# Patient Record
Sex: FEMALE
[De-identification: names, ages, dates, MRNs, and addresses within clinical notes are randomized per-mention and may not be internally consistent; named-entity substitution may affect disease eponyms.]

---

## 2024-10-31 ENCOUNTER — DASHBOARD ENCOUNTERS (OUTPATIENT)
Age: 49
End: 2024-10-31

## 2024-11-01 ENCOUNTER — OFFICE VISIT (OUTPATIENT)
Dept: URBAN - METROPOLITAN AREA CLINIC 113 | Facility: CLINIC | Age: 49
End: 2024-11-01
Payer: COMMERCIAL

## 2024-11-01 ENCOUNTER — LAB OUTSIDE AN ENCOUNTER (OUTPATIENT)
Dept: URBAN - METROPOLITAN AREA CLINIC 113 | Facility: CLINIC | Age: 49
End: 2024-11-01

## 2024-11-01 VITALS
HEART RATE: 71 BPM | BODY MASS INDEX: 47.09 KG/M2 | HEIGHT: 66 IN | SYSTOLIC BLOOD PRESSURE: 152 MMHG | WEIGHT: 293 LBS | DIASTOLIC BLOOD PRESSURE: 100 MMHG | TEMPERATURE: 97.3 F

## 2024-11-01 DIAGNOSIS — Z12.11 COLON CANCER SCREENING: ICD-10-CM

## 2024-11-01 DIAGNOSIS — K92.1 HEMATOCHEZIA: ICD-10-CM

## 2024-11-01 PROBLEM — 305058001: Status: ACTIVE | Noted: 2024-11-01

## 2024-11-01 PROBLEM — 449341000124102: Status: ACTIVE | Noted: 2024-11-01

## 2024-11-01 PROCEDURE — 99213 OFFICE O/P EST LOW 20 MIN: CPT | Performed by: NURSE PRACTITIONER

## 2024-11-01 RX ORDER — ERGOCALCIFEROL 1.25 MG/1
1 CAPSULE CAPSULE ORAL
Status: ACTIVE | COMMUNITY

## 2024-11-01 NOTE — HPI-TODAY'S VISIT:
This is a 49-year-old female with a history of hyperlipidemia, severe obesity referred from Dr. Stovall for colon cancer screening andrectal bleeding.  She denies a family history of colon cancer.  She has not had a prior colonoscopy.  She denies a history of anemia.  She reports 2 prior occasions during which she had blood in her stool.  This occurred in January and in September.  She has otherwise been asymptomatic.  She reports dark red stool and blood on the tissue with an increase in stool frequency lasting 2 to 3 days with each episode.  During the first episode, she was evaluated in the emergency department at [facility] and,  2 or 3 days later, at [facility].  She reports a CT and labs were performed.  She was initially diagnosed with a possible anal fissure and then informed she may have experienced bleeding associated with diverticulosis.  She reports rectal exams were positive for blood.  She has not had recurrence since September.  She is having 2 regular bowel movements per day.  She denies any abdominal symptoms. She denies a history of hypertension.  At the time of her last office visit in June, her blood pressure was 124/82.  She has access to a blood pressure monitor at home which her  uses.  She denies headache or chest pain.

## 2024-11-08 ENCOUNTER — OFFICE VISIT (OUTPATIENT)
Dept: URBAN - METROPOLITAN AREA MEDICAL CENTER 2 | Facility: MEDICAL CENTER | Age: 49
End: 2024-11-08
Payer: COMMERCIAL

## 2024-11-08 DIAGNOSIS — K63.89 OTHER SPECIFIED DISEASES OF INTESTINE: ICD-10-CM

## 2024-11-08 DIAGNOSIS — K62.1 ANAL AND RECTAL POLYP: ICD-10-CM

## 2024-11-08 DIAGNOSIS — K92.1 ACUTE MELENA: ICD-10-CM

## 2024-11-08 PROCEDURE — 45385 COLONOSCOPY W/LESION REMOVAL: CPT | Performed by: STUDENT IN AN ORGANIZED HEALTH CARE EDUCATION/TRAINING PROGRAM

## 2024-11-08 PROCEDURE — 45380 COLONOSCOPY AND BIOPSY: CPT | Performed by: STUDENT IN AN ORGANIZED HEALTH CARE EDUCATION/TRAINING PROGRAM

## 2024-11-08 RX ORDER — ERGOCALCIFEROL 1.25 MG/1
1 CAPSULE CAPSULE ORAL
Status: ACTIVE | COMMUNITY